# Patient Record
Sex: FEMALE | Race: WHITE | NOT HISPANIC OR LATINO | Employment: OTHER | URBAN - METROPOLITAN AREA
[De-identification: names, ages, dates, MRNs, and addresses within clinical notes are randomized per-mention and may not be internally consistent; named-entity substitution may affect disease eponyms.]

---

## 2021-04-16 ENCOUNTER — APPOINTMENT (OUTPATIENT)
Dept: GENERAL RADIOLOGY | Facility: CLINIC | Age: 66
End: 2021-04-16
Attending: EMERGENCY MEDICINE
Payer: COMMERCIAL

## 2021-04-16 ENCOUNTER — HOSPITAL ENCOUNTER (EMERGENCY)
Facility: CLINIC | Age: 66
Discharge: HOME | End: 2021-04-16
Attending: EMERGENCY MEDICINE | Admitting: EMERGENCY MEDICINE
Payer: COMMERCIAL

## 2021-04-16 ENCOUNTER — APPOINTMENT (OUTPATIENT)
Dept: CT IMAGING | Facility: CLINIC | Age: 66
End: 2021-04-16
Attending: EMERGENCY MEDICINE
Payer: COMMERCIAL

## 2021-04-16 VITALS
RESPIRATION RATE: 18 BRPM | HEIGHT: 63 IN | TEMPERATURE: 98.1 F | OXYGEN SATURATION: 99 % | WEIGHT: 143 LBS | BODY MASS INDEX: 25.34 KG/M2 | DIASTOLIC BLOOD PRESSURE: 78 MMHG | SYSTOLIC BLOOD PRESSURE: 137 MMHG | HEART RATE: 121 BPM

## 2021-04-16 DIAGNOSIS — R05.9 COUGH: ICD-10-CM

## 2021-04-16 LAB
ANION GAP SERPL CALCULATED.3IONS-SCNC: 6 MMOL/L (ref 3–14)
BASOPHILS # BLD AUTO: 0.1 10E9/L (ref 0–0.2)
BASOPHILS NFR BLD AUTO: 0.8 %
BUN SERPL-MCNC: 12 MG/DL (ref 7–30)
CALCIUM SERPL-MCNC: 8.2 MG/DL (ref 8.5–10.1)
CHLORIDE SERPL-SCNC: 112 MMOL/L (ref 94–109)
CO2 SERPL-SCNC: 23 MMOL/L (ref 20–32)
CREAT SERPL-MCNC: 0.72 MG/DL (ref 0.52–1.04)
DIFFERENTIAL METHOD BLD: NORMAL
EOSINOPHIL # BLD AUTO: 0.2 10E9/L (ref 0–0.7)
EOSINOPHIL NFR BLD AUTO: 2.4 %
ERYTHROCYTE [DISTWIDTH] IN BLOOD BY AUTOMATED COUNT: 12.8 % (ref 10–15)
FLUAV RNA RESP QL NAA+PROBE: NEGATIVE
FLUBV RNA RESP QL NAA+PROBE: NEGATIVE
GFR SERPL CREATININE-BSD FRML MDRD: 89 ML/MIN/{1.73_M2}
GLUCOSE SERPL-MCNC: 92 MG/DL (ref 70–99)
HCT VFR BLD AUTO: 41.6 % (ref 35–47)
HGB BLD-MCNC: 14 G/DL (ref 11.7–15.7)
IMM GRANULOCYTES # BLD: 0 10E9/L (ref 0–0.4)
IMM GRANULOCYTES NFR BLD: 0.2 %
LABORATORY COMMENT REPORT: NORMAL
LYMPHOCYTES # BLD AUTO: 3.6 10E9/L (ref 0.8–5.3)
LYMPHOCYTES NFR BLD AUTO: 39.3 %
MCH RBC QN AUTO: 28.8 PG (ref 26.5–33)
MCHC RBC AUTO-ENTMCNC: 33.7 G/DL (ref 31.5–36.5)
MCV RBC AUTO: 86 FL (ref 78–100)
MONOCYTES # BLD AUTO: 0.8 10E9/L (ref 0–1.3)
MONOCYTES NFR BLD AUTO: 8.2 %
NEUTROPHILS # BLD AUTO: 4.5 10E9/L (ref 1.6–8.3)
NEUTROPHILS NFR BLD AUTO: 49.1 %
NRBC # BLD AUTO: 0 10*3/UL
NRBC BLD AUTO-RTO: 0 /100
PLATELET # BLD AUTO: 321 10E9/L (ref 150–450)
POTASSIUM SERPL-SCNC: 3 MMOL/L (ref 3.4–5.3)
RBC # BLD AUTO: 4.86 10E12/L (ref 3.8–5.2)
RSV RNA SPEC QL NAA+PROBE: NORMAL
SARS-COV-2 RNA RESP QL NAA+PROBE: NEGATIVE
SODIUM SERPL-SCNC: 141 MMOL/L (ref 133–144)
SPECIMEN SOURCE: NORMAL
WBC # BLD AUTO: 9.2 10E9/L (ref 4–11)

## 2021-04-16 PROCEDURE — 71046 X-RAY EXAM CHEST 2 VIEWS: CPT

## 2021-04-16 PROCEDURE — 85025 COMPLETE CBC W/AUTO DIFF WBC: CPT | Performed by: EMERGENCY MEDICINE

## 2021-04-16 PROCEDURE — 250N000011 HC RX IP 250 OP 636: Performed by: EMERGENCY MEDICINE

## 2021-04-16 PROCEDURE — C9803 HOPD COVID-19 SPEC COLLECT: HCPCS

## 2021-04-16 PROCEDURE — 80048 BASIC METABOLIC PNL TOTAL CA: CPT | Performed by: EMERGENCY MEDICINE

## 2021-04-16 PROCEDURE — 87636 SARSCOV2 & INF A&B AMP PRB: CPT | Performed by: EMERGENCY MEDICINE

## 2021-04-16 PROCEDURE — 71275 CT ANGIOGRAPHY CHEST: CPT

## 2021-04-16 PROCEDURE — 250N000009 HC RX 250: Performed by: EMERGENCY MEDICINE

## 2021-04-16 PROCEDURE — 99285 EMERGENCY DEPT VISIT HI MDM: CPT | Mod: 25

## 2021-04-16 RX ORDER — CLONAZEPAM 0.5 MG/1
0.5 TABLET ORAL 2 TIMES DAILY PRN
Status: ON HOLD | COMMUNITY
End: 2023-08-04

## 2021-04-16 RX ORDER — IOPAMIDOL 755 MG/ML
58 INJECTION, SOLUTION INTRAVASCULAR ONCE
Status: COMPLETED | OUTPATIENT
Start: 2021-04-16 | End: 2021-04-16

## 2021-04-16 RX ADMIN — SODIUM CHLORIDE 85 ML: 9 INJECTION, SOLUTION INTRAVENOUS at 23:10

## 2021-04-16 RX ADMIN — IOPAMIDOL 58 ML: 755 INJECTION, SOLUTION INTRAVENOUS at 23:10

## 2021-04-16 ASSESSMENT — MIFFLIN-ST. JEOR: SCORE: 1172.77

## 2021-04-16 ASSESSMENT — ENCOUNTER SYMPTOMS
NERVOUS/ANXIOUS: 1
COUGH: 1

## 2021-04-17 LAB — INTERPRETATION ECG - MUSE: NORMAL

## 2021-04-17 NOTE — DISCHARGE INSTRUCTIONS
As we discussed, your CT scan is normal today, no evidence of a blood clot, no evidence of pneumonia, and what you have is therefore likely bronchitis or a mild cause of cough.  Please follow with your regular doctor in the week ahead, this is very important since no clear cause of your cough was found today.  Come back to the ER with any other concerns.

## 2021-04-17 NOTE — ED TRIAGE NOTES
Patient presents with complaints of a dry harsh cough for the past three days. She states that she has had multiple negative Covid19 tests, the latest was yesterday that showed a negative result this morning. Patient states that she feels the need to take a deep breath after a coughing spell, otherwise denied any shortness of breath and does not appear to be in any distress here on arrival. Patient's daughter tested positive for Covid19 about two weeks ago and the patient had contact with her at that time.

## 2021-04-17 NOTE — ED PROVIDER NOTES
"  History   Chief Complaint:  Cough       The history is provided by the patient.      Mag Khanna is a 65 year old female with history of anxiety who presents for evaluation of an intermittent dry cough starting around four days ago. The patient states that she has had no other symptoms and no hemoptysis. She notes she has never had this before. She also notes moderate anxiety because of this and the events of the past six months. She denies any history of blood clots and has not been diagnosed with COVID, noting two negative tests. She notes that her daughter whom she had contact with tested positive two weeks ago.       Review of Systems   Respiratory: Positive for cough.    Psychiatric/Behavioral: The patient is nervous/anxious.    All other systems reviewed and are negative.        Allergies:  No Known Drug Allergies     Medications:  Klonopin    Past Medical History:    Anxiety     Past Surgical History:    Cataracts  Hysterectomy  Breast augmentation  chemodenervation    Family History:    COPD    Social History:  The patient presents to the emergency department alone.      Physical Exam     Patient Vitals for the past 24 hrs:   BP Temp Temp src Pulse Resp SpO2 Height Weight   04/16/21 2248 -- -- -- -- -- 99 % -- --   04/16/21 2150 -- -- -- -- -- 100 % -- --   04/16/21 2045 137/78 98.1  F (36.7  C) Oral 121 18 98 % 1.6 m (5' 3\") 64.9 kg (143 lb)       Physical Exam  Vitals: reviewed by me  General: Pt seen on [facility], pleasant, cooperative, and alert to conversation, mildly anxious appearing.  Eyes: Tracking well, clear conjunctiva BL  ENT: MMM, midline trachea.   Lungs:   No tachypnea, no accessory muscle use. No respiratory distress.  Coughing occasionally.  CV: Rate as above, regular rhythm.    Abd: Soft, non tender, no guarding, no rebound. Non distended  MSK: no peripheral edema or joint effusion.  No evidence of trauma  Skin: No rash, normal turgor and temperature  Neuro: Clear speech and no " facial droop.  Psych: Not RIS, no e/o AH/VH      Emergency Department Course     ECG:  ECG taken at , ECG read at   Sinus tachycardia.  Possible left atrial enlargement.  Borderline ECG.  Rate 125 bpm. NH interval 146 ms. QRS duration 72 ms. QT/QTc 314/453 ms. P-R-T axis 67 81 68.       Imaging:    XR Chest 2 views:   Negative chest.  As per radiology.    CT Chest (PE) with Contrast:  1.  No pulmonary embolism. No acute abnormalities evident. No etiology for symptoms identified.  As per radiology.     Laboratory:  CBC: WBC: 9.2, HB.0, PLT: 321    BMP: Potassium 3.0 (low), Chloride 112 (high), Calcium 8.2 (low), o/w WNL (Creatinine: 0.72)    Symptomatic Influenza A/B & SARS-CoV2 (COVID-19) Virus PCR Multiplex: Negative    Emergency Department Course:    Reviewed:  : I reviewed the patient's nursing notes, vitals and past medical records.    Assessments:  : I assessed the patient and performed a physical exam at this time.  : I reassessed the patient and informed them of their workup thus far.  At this point I feel that the patient is safe for discharge, and the patient agrees    Disposition:  The patient was discharged to home.     Impression & Plan     Covid-19  Mag Khanna was evaluated during a global COVID-19 pandemic, which necessitated consideration that the patient might be at risk for infection with the SARS-CoV-2 virus that causes COVID-19.   Applicable protocols for evaluation were followed during the patient's care.   COVID-19 was considered as part of the patient's evaluation. The plan for testing is:  a test was obtained during this visit.     Medical Decision Making:  Mag Khanna is a very pleasant 65 year old female who presents to the emergency department with cough for the last several days. Her x-ray is unremarkable, has normal labs and is not coughing up any blood. However, given her age and veracity of the cough and what she describes as an elevated heart rate, I did opt  for a CT scan which she states she would be in favor of as she feels that a full workup would give her a degree of reassurance, which is of course very reasonable. CT scan was done and shows no pulmonary embolism, no organized pneumonia, and I think taht she may simply have bronchitis. She feels relieved with the negative workup today and we discussed return precautions and instructions for primary care follow-up. The patient is okay with this plan.     Diagnosis:    ICD-10-CM    1. Cough  R05        Discharge Medications:  New Prescriptions    No medications on file       Scribe Disclosure:  I, Chuckie Hardingme, am serving as a scribe at 10:37 PM on 4/16/2021 to document services personally performed by Brayan Arora MD based on my observations and the provider's statements to me.          Brayan Arora MD  04/16/21 [id number]

## 2021-04-30 ENCOUNTER — HOSPITAL ENCOUNTER (EMERGENCY)
Facility: CLINIC | Age: 66
Discharge: HOME | End: 2021-04-30
Attending: EMERGENCY MEDICINE | Admitting: EMERGENCY MEDICINE
Payer: COMMERCIAL

## 2021-04-30 VITALS
BODY MASS INDEX: 25.52 KG/M2 | HEIGHT: 63 IN | OXYGEN SATURATION: 98 % | HEART RATE: 112 BPM | DIASTOLIC BLOOD PRESSURE: 89 MMHG | WEIGHT: 144 LBS | TEMPERATURE: 97.7 F | RESPIRATION RATE: 16 BRPM | SYSTOLIC BLOOD PRESSURE: 146 MMHG

## 2021-04-30 DIAGNOSIS — J20.9 ACUTE BRONCHITIS, UNSPECIFIED ORGANISM: ICD-10-CM

## 2021-04-30 PROCEDURE — 99283 EMERGENCY DEPT VISIT LOW MDM: CPT

## 2021-04-30 RX ORDER — BENZONATATE 200 MG/1
200 CAPSULE ORAL 3 TIMES DAILY PRN
Qty: 20 CAPSULE | Refills: 0 | Status: ON HOLD | OUTPATIENT
Start: 2021-04-30 | End: 2023-08-04

## 2021-04-30 RX ORDER — FEXOFENADINE HCL AND PSEUDOEPHEDRINE HCI 60; 120 MG/1; MG/1
1 TABLET, EXTENDED RELEASE ORAL 2 TIMES DAILY
Qty: 30 TABLET | Refills: 0 | Status: ON HOLD | OUTPATIENT
Start: 2021-04-30 | End: 2023-08-04

## 2021-04-30 RX ORDER — DOXYCYCLINE 100 MG/1
100 CAPSULE ORAL 2 TIMES DAILY
Qty: 20 CAPSULE | Refills: 0 | Status: SHIPPED | OUTPATIENT
Start: 2021-04-30 | End: 2021-05-10

## 2021-04-30 ASSESSMENT — ENCOUNTER SYMPTOMS
SHORTNESS OF BREATH: 1
FEVER: 0
COUGH: 1

## 2021-04-30 ASSESSMENT — MIFFLIN-ST. JEOR: SCORE: 1177.31

## 2021-04-30 NOTE — ED PROVIDER NOTES
"  History   Chief Complaint:  Cough     HPI   Mag Khanna is a 65 year old female with a history of anxiety who presents for evaluation of cough. Over the past few weeks, the patient states that she's had an intermittent dry cough that makes her short of breath. She was seen 21 in the ED for the same symptoms where a work up was done, see below. She followed up her PCP who thought her symptoms may be from post-nasal drip or anxiety. She denies fever, congestion, phlegm, or discolored sputum. She denies allergies to antibiotics. She received her first dose of the Pfizer COVID-19 vaccine and is scheduled to have her second dose today.    Workup 2021  CBC: WBC: 9.2, HB.0, PLT: 321  BMP: Potassium 3.0 (low), Chloride 112 (high), Calcium 8.2 (low), o/w WNL (Creatinine: 0.72)  Symptomatic Influenza A/B & SARS-CoV2 (COVID-19) Virus PCR Multiplex: Negative    XR Chest 2 views:   Negative chest.    CT Chest (PE) with Contrast:  1.  No pulmonary embolism. No acute abnormalities evident. No etiology for symptoms identified.    Review of Systems   Constitutional: Negative for fever.   HENT: Negative for congestion and postnasal drip.    Respiratory: Positive for cough and shortness of breath.    All other systems reviewed and are negative.        Allergies:  The patient has no known allergies.     Medications:  Klonopin  Ativan  Zoloft    Past Medical History:    Anxiety   Cataracts    Past Surgical History:    Hysterectomy   Breast augmentation   Chemodenervation   Cataract extraction with IOL     Family History:    COPD - father, mother     Social History:  Presents to the ED: with her    Tobacco use: Non-smoker  Alcohol use: Yes   Drug use: No   PCP: [facility]    Physical Exam     Patient Vitals for the past 24 hrs:   BP Temp Temp src Pulse Resp SpO2 Height Weight   21 1238 (!) 146/89 97.7  F (36.5  C) Oral 112 16 98 % 1.6 m (5' 3\") 65.3 kg (144 lb)       Physical Exam  HENT:      " Head: Normocephalic.      Right Ear: Tympanic membrane normal.      Left Ear: Tympanic membrane normal.   Cardiovascular:      Rate and Rhythm: Normal rate and regular rhythm.   Pulmonary:      Effort: Pulmonary effort is normal.      Breath sounds: Normal breath sounds. No stridor. No wheezing or rhonchi.   Abdominal:      General: Abdomen is flat.   Musculoskeletal: Normal range of motion.   Skin:     General: Skin is warm.      Capillary Refill: Capillary refill takes less than 2 seconds.   Neurological:      Mental Status: She is alert.         Emergency Department Course     Emergency Department Course:    Reviewed:  I reviewed the patient's nursing notes, vitals and past medical history.     Assessments:  1236 I performed an exam of the patient in room ED26 as documented above.    Disposition:  The patient was discharged to home.     Impression & Plan     CMS Diagnoses: None    Medical Decision Making:  Mag Khanna is a 65 year old female who presents to the emergency department today for evaluation of 2 weeks of coughing.  Patient is already been ruled out for Covid.  Patient seems to be very focused on her cough but barely coughs while I am talking to her.  She has been seen by her primary doctor for this as well clinical suspicions with postnasal drip.  Patient denies productive cough or high fever.  Did consider repeat chest imaging but based on 2 weeks of symptomatology offered an empiric antibiotic for bronchitis Tessalon Perles and decongestant in the form of Allegra-D and follow-up with primary care.  No need for emergency imaging as patient is already had chest and x-ray and CT scan at prior visit patient is offered reassurance and discharged home      Diagnosis:    ICD-10-CM    1. Acute bronchitis, unspecified organism  J20.9        Discharge Medications:   Discharge Medication List as of 4/30/2021  1:01 PM      START taking these medications    Details   benzonatate (TESSALON) 200 MG capsule Take 1  capsule (200 mg) by mouth 3 times daily as needed for cough, Disp-20 capsule, R-0, Local Print      doxycycline hyclate (VIBRAMYCIN) 100 MG capsule Take 1 capsule (100 mg) by mouth 2 times daily for 10 days, Disp-20 capsule, R-0, Local Print      fexofenadine-pseudoePHEDrine (ALLEGRA-D)  MG 12 hr tablet Take 1 tablet by mouth 2 times daily, Disp-30 tablet, R-0, Local Print           Scribe Disclosure:  I, Shayla Pereira, am serving as a scribe at 12:36 PM on 4/30/2021 to document services personally performed by Ryan Fernandes MD based on my observations and the provider's statements to me.    This note was completed in part using Dragon voice recognition software. Although reviewed after completion, some word and grammatical errors may occur.      Ryan Fernandes MD  05/02/21 7529

## 2021-04-30 NOTE — DISCHARGE INSTRUCTIONS
Since you have been coughing for 2 weeks without resolution we are initiating an antibiotic.  Your oxygen levels are normal today.  Use cough medication as needed.  Please reschedule your Covid vaccination and return with increased shortness of breath or high fever.  If your cough continues please follow-up with your regular doctor for reassessment.

## 2021-04-30 NOTE — ED TRIAGE NOTES
Seen two weeks ago for same thing. Dry cough with makes her SOB. Pt has had 2 negative covid tests.

## 2021-05-07 ENCOUNTER — HOSPITAL ENCOUNTER (EMERGENCY)
Facility: CLINIC | Age: 66
Discharge: HOME | End: 2021-05-07
Attending: EMERGENCY MEDICINE | Admitting: EMERGENCY MEDICINE
Payer: COMMERCIAL

## 2021-05-07 VITALS
BODY MASS INDEX: 25.34 KG/M2 | WEIGHT: 143 LBS | HEIGHT: 63 IN | SYSTOLIC BLOOD PRESSURE: 137 MMHG | HEART RATE: 85 BPM | TEMPERATURE: 97.5 F | OXYGEN SATURATION: 100 % | RESPIRATION RATE: 16 BRPM | DIASTOLIC BLOOD PRESSURE: 72 MMHG

## 2021-05-07 DIAGNOSIS — R05.9 COUGH: ICD-10-CM

## 2021-05-07 DIAGNOSIS — K21.9 GASTROESOPHAGEAL REFLUX DISEASE, UNSPECIFIED WHETHER ESOPHAGITIS PRESENT: ICD-10-CM

## 2021-05-07 PROCEDURE — 99282 EMERGENCY DEPT VISIT SF MDM: CPT

## 2021-05-07 ASSESSMENT — ENCOUNTER SYMPTOMS
SHORTNESS OF BREATH: 1
COUGH: 1

## 2021-05-07 ASSESSMENT — MIFFLIN-ST. JEOR: SCORE: 1172.77

## 2021-05-07 NOTE — ED TRIAGE NOTES
Pt has been seen multiple times for a cough, not improving, has had first covid vaccine, mild sob

## 2021-05-08 ASSESSMENT — ENCOUNTER SYMPTOMS
ABDOMINAL PAIN: 0
TROUBLE SWALLOWING: 0
FEVER: 0

## 2021-05-08 NOTE — ED PROVIDER NOTES
"  History   Chief Complaint:  Cough       HPI   Mag Khanna is a 65 year old female who presents with cough. The patient stated that her symptoms started about a month ago, when she developed cough that comes and goes away, along with some shortness of breath which was causing her to feel more anxious. Since then she was seen for the same symptoms twice in [facility] ED, on 16th and 30th of April. The patient asked her primary for something stronger than lorazepam for her anxiety and was prescribed Clonazepam. She said she is feeling anxious constantly and that she cannot relax. She denies taking blood pressure medications, fever, trouble swallowing or any chronic medical conditions or smoking.      Review of Systems   Constitutional: Negative for fever.   HENT: Negative for trouble swallowing.    Respiratory: Positive for cough and shortness of breath.    Cardiovascular: Negative for chest pain.   Gastrointestinal: Negative for abdominal pain.   All other systems reviewed and are negative.    Allergies:  The patient has no known allergies.     Medications:  Benzonatate  Clonazepam  Vibramycin  Allegra-D    Past Medical History:    Anxiety  Cataracts     Past Surgical History:    Hysterectomy  Breast augmentation  CHemodenervation  Cataract extraction     Family History:    The patient denies past family history.     Social History:  The patient was brought to the emergency department by private vehicle.  The patient presents to the emergency department with her .    Physical Exam     Patient Vitals for the past 24 hrs:   BP Temp Temp src Pulse Resp SpO2 Height Weight   05/07/21 1831 137/72 97.5  F (36.4  C) Temporal 85 16 100 % 1.6 m (5' 3\") 64.9 kg (143 lb)       Physical Exam   General: Alert and cooperative with exam. Patient in mild distress. Normal mentation. Anxious appearance.   Head:  Scalp is NC/AT  Eyes:  No scleral icterus, PERRL  ENT:  The external nose and ears are normal. mucus membranes are " moist. Uvula midline, no evidence of deep space infection. Mild posterior oropharynx erythema.  Neck:  Normal range of motion without rigidity.  CV:  Regular rate and rhythm    No pathologic murmur   Resp:  Breath sounds are clear bilaterally    Non-labored, no retractions or accessory muscle use  GI:  Abdomen is soft, no distension, no tenderness. No peritoneal signs  MS:  No lower extremity edema   Skin:  Warm and dry, No rash or lesions noted.  Neuro: Oriented x 3. No gross motor deficits.      Emergency Department Course     Emergency Department Course:    Reviewed:  I reviewed nursing notes, vitals, past medical history and care everywhere    Assessments/Consults  ED Course as of May 07 1925   Fri May 07, 2021   1924 I obtained history and examined the patient as noted above.        Disposition:  The patient was discharged to home.       Impression & Plan     Medical Decision Making:  The patient is a 65-year-old female presents with intermittent cough since mid April.  Patient's medical history and records reviewed.  She has undergone recent extensive negative work-up including negative CTA of the chest as well as negative lab and cardiac work-up.  Here in the ED she is afebrile and has no history of fever and she does not feel her symptoms are infectious.  She has completed a course of doxycycline without any significant change.  Possibilities for symptoms include acid reflux, viral bronchitis, postnasal drip, anxiety, among others.  Recommended trial of Pepcid and discussed supportive care for GERD.  Patient should follow-up closely with her PCP for reevaluation; may benefit from outpatient ENT evaluation if still no improvement.  Return precautions were discussed.  Discharged home.  No emergent indication for further labs or imaging at this time.    Diagnosis:    ICD-10-CM    1. Cough  R05    2. Gastroesophageal reflux disease, unspecified whether esophagitis present  K21.9        Scribe Disclosure:  I  Alfonso Guardado, am serving as a scribe at 7:25 PM on 5/7/2021 to document services personally performed by Larry Gates DO based on my observations and the provider's statements to me.            Larry Gates DO  05/08/21 0119

## 2022-03-24 ENCOUNTER — APPOINTMENT (OUTPATIENT)
Dept: GENERAL RADIOLOGY | Facility: CLINIC | Age: 67
End: 2022-03-24
Attending: EMERGENCY MEDICINE
Payer: COMMERCIAL

## 2022-03-24 ENCOUNTER — HOSPITAL ENCOUNTER (EMERGENCY)
Facility: CLINIC | Age: 67
Discharge: HOME | End: 2022-03-24
Attending: EMERGENCY MEDICINE | Admitting: EMERGENCY MEDICINE
Payer: COMMERCIAL

## 2022-03-24 VITALS
TEMPERATURE: 97.3 F | DIASTOLIC BLOOD PRESSURE: 75 MMHG | OXYGEN SATURATION: 98 % | HEART RATE: 110 BPM | BODY MASS INDEX: 22.85 KG/M2 | SYSTOLIC BLOOD PRESSURE: 125 MMHG | RESPIRATION RATE: 17 BRPM | WEIGHT: 129 LBS

## 2022-03-24 DIAGNOSIS — R06.00 DYSPNEA, UNSPECIFIED TYPE: ICD-10-CM

## 2022-03-24 LAB
ANION GAP SERPL CALCULATED.3IONS-SCNC: 7 MMOL/L (ref 3–14)
ATRIAL RATE - MUSE: 89 BPM
BASE EXCESS BLDV CALC-SCNC: -2.3 MMOL/L (ref -7.7–1.9)
BASOPHILS # BLD AUTO: 0.1 10E3/UL (ref 0–0.2)
BASOPHILS NFR BLD AUTO: 1 %
BUN SERPL-MCNC: 12 MG/DL (ref 7–30)
CALCIUM SERPL-MCNC: 8.6 MG/DL (ref 8.5–10.1)
CHLORIDE BLD-SCNC: 113 MMOL/L (ref 94–109)
CO2 SERPL-SCNC: 21 MMOL/L (ref 20–32)
CREAT SERPL-MCNC: 0.66 MG/DL (ref 0.52–1.04)
D DIMER PPP FEU-MCNC: 0.58 UG/ML FEU (ref 0–0.5)
DIASTOLIC BLOOD PRESSURE - MUSE: NORMAL MMHG
EOSINOPHIL # BLD AUTO: 0.2 10E3/UL (ref 0–0.7)
EOSINOPHIL NFR BLD AUTO: 3 %
ERYTHROCYTE [DISTWIDTH] IN BLOOD BY AUTOMATED COUNT: 12.8 % (ref 10–15)
FLUAV RNA SPEC QL NAA+PROBE: NEGATIVE
FLUBV RNA RESP QL NAA+PROBE: NEGATIVE
GFR SERPL CREATININE-BSD FRML MDRD: >90 ML/MIN/1.73M2
GLUCOSE BLD-MCNC: 103 MG/DL (ref 70–99)
HCO3 BLDV-SCNC: 22 MMOL/L (ref 21–28)
HCT VFR BLD AUTO: 43.5 % (ref 35–47)
HGB BLD-MCNC: 14.2 G/DL (ref 11.7–15.7)
IMM GRANULOCYTES # BLD: 0 10E3/UL
IMM GRANULOCYTES NFR BLD: 0 %
INTERPRETATION ECG - MUSE: NORMAL
LYMPHOCYTES # BLD AUTO: 3 10E3/UL (ref 0.8–5.3)
LYMPHOCYTES NFR BLD AUTO: 35 %
MCH RBC QN AUTO: 29 PG (ref 26.5–33)
MCHC RBC AUTO-ENTMCNC: 32.6 G/DL (ref 31.5–36.5)
MCV RBC AUTO: 89 FL (ref 78–100)
MONOCYTES # BLD AUTO: 0.7 10E3/UL (ref 0–1.3)
MONOCYTES NFR BLD AUTO: 8 %
NEUTROPHILS # BLD AUTO: 4.5 10E3/UL (ref 1.6–8.3)
NEUTROPHILS NFR BLD AUTO: 53 %
NRBC # BLD AUTO: 0 10E3/UL
NRBC BLD AUTO-RTO: 0 /100
NT-PROBNP SERPL-MCNC: 41 PG/ML (ref 0–900)
O2/TOTAL GAS SETTING VFR VENT: 21 %
P AXIS - MUSE: 61 DEGREES
PCO2 BLDV: 33 MM HG (ref 40–50)
PH BLDV: 7.42 [PH] (ref 7.32–7.43)
PLATELET # BLD AUTO: 303 10E3/UL (ref 150–450)
PO2 BLDV: 29 MM HG (ref 25–47)
POTASSIUM BLD-SCNC: 3.9 MMOL/L (ref 3.4–5.3)
PR INTERVAL - MUSE: 152 MS
QRS DURATION - MUSE: 70 MS
QT - MUSE: 380 MS
QTC - MUSE: 462 MS
R AXIS - MUSE: 71 DEGREES
RBC # BLD AUTO: 4.9 10E6/UL (ref 3.8–5.2)
RSV RNA SPEC NAA+PROBE: NEGATIVE
SARS-COV-2 RNA RESP QL NAA+PROBE: NEGATIVE
SODIUM SERPL-SCNC: 141 MMOL/L (ref 133–144)
SYSTOLIC BLOOD PRESSURE - MUSE: NORMAL MMHG
T AXIS - MUSE: 67 DEGREES
TROPONIN I SERPL HS-MCNC: 3 NG/L
VENTRICULAR RATE- MUSE: 89 BPM
WBC # BLD AUTO: 8.5 10E3/UL (ref 4–11)

## 2022-03-24 PROCEDURE — C9803 HOPD COVID-19 SPEC COLLECT: HCPCS

## 2022-03-24 PROCEDURE — 36415 COLL VENOUS BLD VENIPUNCTURE: CPT | Performed by: EMERGENCY MEDICINE

## 2022-03-24 PROCEDURE — 85379 FIBRIN DEGRADATION QUANT: CPT | Performed by: EMERGENCY MEDICINE

## 2022-03-24 PROCEDURE — 80048 BASIC METABOLIC PNL TOTAL CA: CPT | Performed by: EMERGENCY MEDICINE

## 2022-03-24 PROCEDURE — 87637 SARSCOV2&INF A&B&RSV AMP PRB: CPT | Performed by: EMERGENCY MEDICINE

## 2022-03-24 PROCEDURE — 99285 EMERGENCY DEPT VISIT HI MDM: CPT | Mod: 25

## 2022-03-24 PROCEDURE — 85025 COMPLETE CBC W/AUTO DIFF WBC: CPT | Performed by: EMERGENCY MEDICINE

## 2022-03-24 PROCEDURE — 84484 ASSAY OF TROPONIN QUANT: CPT | Performed by: EMERGENCY MEDICINE

## 2022-03-24 PROCEDURE — 93005 ELECTROCARDIOGRAM TRACING: CPT

## 2022-03-24 PROCEDURE — 71046 X-RAY EXAM CHEST 2 VIEWS: CPT

## 2022-03-24 PROCEDURE — 83880 ASSAY OF NATRIURETIC PEPTIDE: CPT | Performed by: EMERGENCY MEDICINE

## 2022-03-24 PROCEDURE — 82803 BLOOD GASES ANY COMBINATION: CPT | Performed by: EMERGENCY MEDICINE

## 2022-03-24 ASSESSMENT — ENCOUNTER SYMPTOMS
VOMITING: 0
CONSTIPATION: 0
COUGH: 1
PALPITATIONS: 1
CHILLS: 0
DIARRHEA: 0
FEVER: 0
NAUSEA: 0
SHORTNESS OF BREATH: 1

## 2022-03-25 NOTE — ED PROVIDER NOTES
History   Chief Complaint:  Shortness of Breath       The history is provided by the patient.      Mag Khanna is a 66 year old female with history of anxiety who presents for evaluation of shortness of breath. She reports shortness of breath that comes and goes, noting a visit to the emergency department for similar symptoms about 6 months ago. The patient reports having to draw in deep breaths, but also notes an occasional dry cough with deep inhalation. She does not think her shortness of breath is worse with exertion.  The patient reports that today may not have been much worse, but her  thinks it has been worse than in the past. He notes that today was extra stressful because they went to a . The patient reports that her symptoms may be anxiety, but she wants to make sure that they are not due to other issues. She mentions palpitations occurring every once in a while, noting only a few beats at a time. The patient has lorazepam and states that she often takes this when she feels short of breath. She states that she feels like this helps, but not always fully. The patient speaks with a therapist to have her prescriptions filled, but does not see this person regularly.  She has not had a stress test or echo in the past. The patient denies chest pain, fevers, chills, nausea, vomiting, and changes in urination or bowel movements. She also denies personal or family history of PE/DVT. She notes that her dad had a stroke. The patient denies tobacco use since she was 31 years old, reports occasional alcohol consumption. She is vaccinated for COVID-19.     Review of Systems   Constitutional: Negative for chills and fever.   Respiratory: Positive for cough and shortness of breath.    Cardiovascular: Positive for palpitations. Negative for chest pain.   Gastrointestinal: Negative for constipation, diarrhea, nausea and vomiting.   Genitourinary: Negative.    All other systems reviewed and are  negative.        Allergies:  The patient has no known allergies.     Medications:  Tessalon  Clonazepam  Allegra  Lorazepam     Past Medical History:     Cataracts, bilateral  Anxiety      Past Surgical History:    Hysterectomy  Breast augmentation  Chemodenervation  Cataract extraction w/ intraocular lens implant x2     Family History:    COPD    Social History:  The patient presents with her .  Denies tobacco use, reports occasional alcohol consumption.      Physical Exam     Patient Vitals for the past 24 hrs:   BP Temp Temp src Pulse Resp SpO2 Weight   03/24/22 2300 125/75 -- -- 110 -- 98 % --   03/24/22 2230 124/79 -- -- 105 -- 99 % --   03/24/22 2200 109/74 -- -- 91 17 99 % --   03/24/22 2130 122/79 -- -- 89 19 99 % --   03/24/22 2100 118/78 -- -- 89 18 99 % --   03/24/22 2030 110/73 -- -- 99 14 100 % --   03/24/22 2020 112/82 -- -- 103 (!) 36 100 % --   03/24/22 2004 117/85 97.3  F (36.3  C) Temporal 106 15 97 % 58.5 kg (129 lb)       Physical Exam  Constitutional: Well developed, nontox appearance  Head: Atraumatic.   Neck:  no stridor  Eyes: no scleral icterus  Cardiovascular: Borderline regular tachycardia, 2+ bilat radial pulses  Pulmonary/Chest: nml resp effort, Clear BS bilat  Abdominal: ND, soft, NT, no rebound or guarding   Ext: Warm, well perfused, no edema  Neurological: A&O, symmetric facies, moves ext x4  Skin: Skin is warm and dry.   Psychiatric: Somewhat anxious  Nursing note and vitals reviewed.    Emergency Department Course   ECG  ECG obtained at 2103, ECG read at 2107.  Normal sinus rhythm. Cannot rule out anterior infarct, age undetermined. Abnormal ECG.    No significant change as compared to prior, dated 4/16/2021.  Rate 89 bpm. VT interval 152 ms. QRS duration 70 ms. QT/QTc 380/462 ms. P-R-T axes 61 71 67.      Imaging:  XR Chest 2 Views   Final Result   IMPRESSION: Negative chest.      Report per radiology    Laboratory:  Labs Ordered and Resulted from Time of ED Arrival to Time  of ED Departure   D DIMER QUANTITATIVE - Abnormal       Result Value    D-Dimer Quantitative 0.58 (*)    BASIC METABOLIC PANEL - Abnormal    Sodium 141      Potassium 3.9      Chloride 113 (*)     Carbon Dioxide (CO2) 21      Anion Gap 7      Urea Nitrogen 12      Creatinine 0.66      Calcium 8.6      Glucose 103 (*)     GFR Estimate >90     BLOOD GAS VENOUS - Abnormal    pH Venous 7.42      pCO2 Venous 33 (*)     pO2 Venous 29      Bicarbonate Venous 22      Base Excess/Deficit (+/-) -2.3      FIO2 21     TROPONIN I - Normal    Troponin I High Sensitivity 3     NT PROBNP INPATIENT - Normal    N terminal Pro BNP Inpatient 41     INFLUENZA A/B & SARS-COV2 PCR MULTIPLEX - Normal    Influenza A PCR Negative      Influenza B PCR Negative      RSV PCR Negative      SARS CoV2 PCR Negative     CBC WITH PLATELETS AND DIFFERENTIAL    WBC Count 8.5      RBC Count 4.90      Hemoglobin 14.2      Hematocrit 43.5      MCV 89      MCH 29.0      MCHC 32.6      RDW 12.8      Platelet Count 303      % Neutrophils 53      % Lymphocytes 35      % Monocytes 8      % Eosinophils 3      % Basophils 1      % Immature Granulocytes 0      NRBCs per 100 WBC 0      Absolute Neutrophils 4.5      Absolute Lymphocytes 3.0      Absolute Monocytes 0.7      Absolute Eosinophils 0.2      Absolute Basophils 0.1      Absolute Immature Granulocytes 0.0      Absolute NRBCs 0.0          Emergency Department Course:     Reviewed:  I reviewed nursing notes, vitals and Care Everywhere    Assessments:   I obtained history and examined the patient as noted above.    I rechecked the patient and explained findings. At this point I feel that the patient is safe for discharge, and the patient agrees.     Disposition:  The patient was discharged to home.     Impression & Plan     Medical Decision Makin year old female presenting w/ shortness of breath, nonexertional     DDx includes anxiety, generalized anxiety disorder, panic attack, ACS, CHF, PE,  viral illness, pneumonia.  EKG interpretation as noted above.  Labs significant for D-dimer within age-adjusted normal limits, otherwise unremarkable.  Imaging sig for no acute cardiopulmonary disease.  Patient symptoms seem somewhat anxiety related particularly given her history, it may be prudent to have her follow-up with her PCP for further discussion of outpatient stress test.  She is understanding and agreeable to this plan.  Given her reassuring work-up and normal vital signs, at this time I feel the pt is safe for discharge.  Recommendations given regarding follow up with PCP and return to the emergency department as needed for new or worsening symptoms.  Patient and  counseled on all results, disposition and diagnosis.  They are understanding and agreeable to plan. Patient discharged in stable condition.         Diagnosis:    ICD-10-CM    1. Dyspnea, unspecified type  R06.00        Discharge Medications:  Discharge Medication List as of 3/24/2022 11:52 PM          Scribe Disclosure:  I, Randi Mara, am serving as a scribe at 8:23 PM on 3/24/2022 to document services personally performed by Larry Bridges MD based on my observations and the provider's statements to me.          Larry Bridges MD  03/25/22 0040

## 2022-03-25 NOTE — DISCHARGE INSTRUCTIONS
Discharge Instructions  Shortness of breath    You have been seen today for shortness of breath.  At this time, your provider has found no signs that your shortness of breath is due to a serious or life-threatening condition, (or you have declined more testing and/or admission to the hospital). However, sometimes there is a serious problem that does not show up right away. Your evaluation today may not be complete and you may need further testing and evaluation.     Generally, every Emergency Department visit should have a follow-up clinic visit with either a primary or a specialty clinic/provider. Please follow-up as instructed by your emergency provider today.  Return to the Emergency Department if:  Your shortness of breath changes, gets worse, starts to happen more often, or comes with less activity.  You are newly chest pain  You get very weak or tired.  You pass out or faint.  You have any new symptoms, like fever, cough, numb legs, or you cough up blood.  You have anything else that worries you.    Until you follow-up with your regular provider, please do the following:  Take one aspirin daily unless you have an allergy or are told not to by your provider.  If a stress test appointment has been made, go to the appointment.  If you have questions, contact your regular provider.  Follow-up with your regular provider/clinic as directed; this is very important.    If you were given a prescription for medicine here today, be sure to read all of the information (including the package insert) that comes with your prescription.  This will include important information about the medicine, its side effects, and any warnings that you need to know about.  The pharmacist who fills the prescription can provide more information and answer questions you may have about the medicine.  If you have questions or concerns that the pharmacist cannot address, please call or return to the Emergency Department.       Remember that  you can always come back to the Emergency Department if you are not able to see your regular provider in the amount of time listed above, if you get any new symptoms, or if there is anything that worries you.

## 2022-03-25 NOTE — ED TRIAGE NOTES
SOB on and off for a few months has been thinking is anxiety so she took her Lorazeam 1mg at about 1400 but still feels SOB. Denies any chest pain or other symptoms.

## 2023-08-04 ENCOUNTER — ANESTHESIA EVENT (OUTPATIENT)
Dept: SURGERY | Facility: CLINIC | Age: 68
End: 2023-08-04

## 2023-08-04 ENCOUNTER — HOSPITAL ENCOUNTER (OUTPATIENT)
Facility: CLINIC | Age: 68
Discharge: HOME | End: 2023-08-04
Attending: PLASTIC SURGERY | Admitting: PLASTIC SURGERY

## 2023-08-04 ENCOUNTER — ANESTHESIA (OUTPATIENT)
Dept: SURGERY | Facility: CLINIC | Age: 68
End: 2023-08-04

## 2023-08-04 VITALS
TEMPERATURE: 96.8 F | HEIGHT: 63 IN | RESPIRATION RATE: 14 BRPM | BODY MASS INDEX: 24.98 KG/M2 | HEART RATE: 108 BPM | DIASTOLIC BLOOD PRESSURE: 83 MMHG | SYSTOLIC BLOOD PRESSURE: 127 MMHG | WEIGHT: 141 LBS | OXYGEN SATURATION: 96 %

## 2023-08-04 DIAGNOSIS — R23.8 FACIAL AGING: Primary | ICD-10-CM

## 2023-08-04 PROCEDURE — 250N000009 HC RX 250: Performed by: PLASTIC SURGERY

## 2023-08-04 PROCEDURE — 250N000013 HC RX MED GY IP 250 OP 250 PS 637: Performed by: ANESTHESIOLOGY

## 2023-08-04 PROCEDURE — 250N000013 HC RX MED GY IP 250 OP 250 PS 637: Performed by: PLASTIC SURGERY

## 2023-08-04 PROCEDURE — 272N000001 HC OR GENERAL SUPPLY STERILE: Performed by: PLASTIC SURGERY

## 2023-08-04 PROCEDURE — 250N000009 HC RX 250: Performed by: NURSE ANESTHETIST, CERTIFIED REGISTERED

## 2023-08-04 PROCEDURE — 370N000017 HC ANESTHESIA TECHNICAL FEE, PER MIN: Performed by: PLASTIC SURGERY

## 2023-08-04 PROCEDURE — 360N000076 HC SURGERY LEVEL 3, PER MIN: Performed by: PLASTIC SURGERY

## 2023-08-04 PROCEDURE — 999N000141 HC STATISTIC PRE-PROCEDURE NURSING ASSESSMENT: Performed by: PLASTIC SURGERY

## 2023-08-04 PROCEDURE — 250N000011 HC RX IP 250 OP 636: Mod: JZ | Performed by: NURSE ANESTHETIST, CERTIFIED REGISTERED

## 2023-08-04 PROCEDURE — 250N000011 HC RX IP 250 OP 636: Performed by: PLASTIC SURGERY

## 2023-08-04 PROCEDURE — 258N000003 HC RX IP 258 OP 636: Performed by: NURSE ANESTHETIST, CERTIFIED REGISTERED

## 2023-08-04 PROCEDURE — 250N000025 HC SEVOFLURANE, PER MIN: Performed by: PLASTIC SURGERY

## 2023-08-04 PROCEDURE — 710N000009 HC RECOVERY PHASE 1, LEVEL 1, PER MIN: Performed by: PLASTIC SURGERY

## 2023-08-04 PROCEDURE — 93005 ELECTROCARDIOGRAM TRACING: CPT

## 2023-08-04 PROCEDURE — 258N000001 HC RX 258: Performed by: PLASTIC SURGERY

## 2023-08-04 PROCEDURE — 710N000012 HC RECOVERY PHASE 2, PER MINUTE: Performed by: PLASTIC SURGERY

## 2023-08-04 PROCEDURE — 250N000011 HC RX IP 250 OP 636: Mod: JZ | Performed by: PLASTIC SURGERY

## 2023-08-04 PROCEDURE — P9045 ALBUMIN (HUMAN), 5%, 250 ML: HCPCS | Mod: JZ | Performed by: NURSE ANESTHETIST, CERTIFIED REGISTERED

## 2023-08-04 RX ORDER — SODIUM CHLORIDE, SODIUM LACTATE, POTASSIUM CHLORIDE, CALCIUM CHLORIDE 600; 310; 30; 20 MG/100ML; MG/100ML; MG/100ML; MG/100ML
INJECTION, SOLUTION INTRAVENOUS CONTINUOUS
Status: DISCONTINUED | OUTPATIENT
Start: 2023-08-04 | End: 2023-08-04 | Stop reason: HOSPADM

## 2023-08-04 RX ORDER — HEPARIN SODIUM 5000 [USP'U]/.5ML
5000 INJECTION, SOLUTION INTRAVENOUS; SUBCUTANEOUS
Status: COMPLETED | OUTPATIENT
Start: 2023-08-04 | End: 2023-08-04

## 2023-08-04 RX ORDER — HYDROMORPHONE HCL IN WATER/PF 6 MG/30 ML
0.2 PATIENT CONTROLLED ANALGESIA SYRINGE INTRAVENOUS EVERY 5 MIN PRN
Status: DISCONTINUED | OUTPATIENT
Start: 2023-08-04 | End: 2023-08-04 | Stop reason: HOSPADM

## 2023-08-04 RX ORDER — ONDANSETRON 2 MG/ML
4 INJECTION INTRAMUSCULAR; INTRAVENOUS EVERY 30 MIN PRN
Status: DISCONTINUED | OUTPATIENT
Start: 2023-08-04 | End: 2023-08-04 | Stop reason: HOSPADM

## 2023-08-04 RX ORDER — LORAZEPAM 1 MG/1
1 TABLET ORAL EVERY 6 HOURS PRN
COMMUNITY

## 2023-08-04 RX ORDER — FENTANYL CITRATE 50 UG/ML
50 INJECTION, SOLUTION INTRAMUSCULAR; INTRAVENOUS EVERY 5 MIN PRN
Status: DISCONTINUED | OUTPATIENT
Start: 2023-08-04 | End: 2023-08-04 | Stop reason: HOSPADM

## 2023-08-04 RX ORDER — OXYCODONE HYDROCHLORIDE 5 MG/1
10 TABLET ORAL
Status: DISCONTINUED | OUTPATIENT
Start: 2023-08-04 | End: 2023-08-04 | Stop reason: HOSPADM

## 2023-08-04 RX ORDER — MEPERIDINE HYDROCHLORIDE 25 MG/ML
12.5 INJECTION INTRAMUSCULAR; INTRAVENOUS; SUBCUTANEOUS EVERY 5 MIN PRN
Status: DISCONTINUED | OUTPATIENT
Start: 2023-08-04 | End: 2023-08-04 | Stop reason: HOSPADM

## 2023-08-04 RX ORDER — FENTANYL CITRATE 50 UG/ML
INJECTION, SOLUTION INTRAMUSCULAR; INTRAVENOUS PRN
Status: DISCONTINUED | OUTPATIENT
Start: 2023-08-04 | End: 2023-08-04

## 2023-08-04 RX ORDER — ONDANSETRON 4 MG/1
4 TABLET, ORALLY DISINTEGRATING ORAL EVERY 30 MIN PRN
Status: DISCONTINUED | OUTPATIENT
Start: 2023-08-04 | End: 2023-08-04 | Stop reason: HOSPADM

## 2023-08-04 RX ORDER — EPHEDRINE SULFATE 50 MG/ML
INJECTION, SOLUTION INTRAMUSCULAR; INTRAVENOUS; SUBCUTANEOUS PRN
Status: DISCONTINUED | OUTPATIENT
Start: 2023-08-04 | End: 2023-08-04

## 2023-08-04 RX ORDER — HYDROCODONE BITARTRATE AND ACETAMINOPHEN 5; 325 MG/1; MG/1
1 TABLET ORAL EVERY 6 HOURS PRN
Qty: 12 TABLET | Refills: 0 | Status: SHIPPED | OUTPATIENT
Start: 2023-08-04 | End: 2023-08-07

## 2023-08-04 RX ORDER — DEXAMETHASONE SODIUM PHOSPHATE 4 MG/ML
INJECTION, SOLUTION INTRA-ARTICULAR; INTRALESIONAL; INTRAMUSCULAR; INTRAVENOUS; SOFT TISSUE PRN
Status: DISCONTINUED | OUTPATIENT
Start: 2023-08-04 | End: 2023-08-04

## 2023-08-04 RX ORDER — LIDOCAINE HYDROCHLORIDE AND EPINEPHRINE 10; 10 MG/ML; UG/ML
INJECTION, SOLUTION INFILTRATION; PERINEURAL PRN
Status: DISCONTINUED | OUTPATIENT
Start: 2023-08-04 | End: 2023-08-04 | Stop reason: HOSPADM

## 2023-08-04 RX ORDER — HYDRALAZINE HYDROCHLORIDE 20 MG/ML
2.5-5 INJECTION INTRAMUSCULAR; INTRAVENOUS
Status: DISCONTINUED | OUTPATIENT
Start: 2023-08-04 | End: 2023-08-04 | Stop reason: HOSPADM

## 2023-08-04 RX ORDER — SODIUM CHLORIDE, SODIUM LACTATE, POTASSIUM CHLORIDE, CALCIUM CHLORIDE 600; 310; 30; 20 MG/100ML; MG/100ML; MG/100ML; MG/100ML
INJECTION, SOLUTION INTRAVENOUS CONTINUOUS PRN
Status: DISCONTINUED | OUTPATIENT
Start: 2023-08-04 | End: 2023-08-04

## 2023-08-04 RX ORDER — PROPOFOL 10 MG/ML
INJECTION, EMULSION INTRAVENOUS CONTINUOUS PRN
Status: DISCONTINUED | OUTPATIENT
Start: 2023-08-04 | End: 2023-08-04

## 2023-08-04 RX ORDER — CEFAZOLIN SODIUM/WATER 2 G/20 ML
2 SYRINGE (ML) INTRAVENOUS SEE ADMIN INSTRUCTIONS
Status: DISCONTINUED | OUTPATIENT
Start: 2023-08-04 | End: 2023-08-04 | Stop reason: HOSPADM

## 2023-08-04 RX ORDER — CEFAZOLIN SODIUM/WATER 2 G/20 ML
2 SYRINGE (ML) INTRAVENOUS
Status: COMPLETED | OUTPATIENT
Start: 2023-08-04 | End: 2023-08-04

## 2023-08-04 RX ORDER — OXYCODONE HYDROCHLORIDE 5 MG/1
5 TABLET ORAL
Status: COMPLETED | OUTPATIENT
Start: 2023-08-04 | End: 2023-08-04

## 2023-08-04 RX ORDER — LABETALOL HYDROCHLORIDE 5 MG/ML
5 INJECTION, SOLUTION INTRAVENOUS
Status: DISCONTINUED | OUTPATIENT
Start: 2023-08-04 | End: 2023-08-04 | Stop reason: HOSPADM

## 2023-08-04 RX ORDER — ONDANSETRON 2 MG/ML
INJECTION INTRAMUSCULAR; INTRAVENOUS PRN
Status: DISCONTINUED | OUTPATIENT
Start: 2023-08-04 | End: 2023-08-04

## 2023-08-04 RX ORDER — PROPOFOL 10 MG/ML
INJECTION, EMULSION INTRAVENOUS PRN
Status: DISCONTINUED | OUTPATIENT
Start: 2023-08-04 | End: 2023-08-04

## 2023-08-04 RX ORDER — MAGNESIUM HYDROXIDE 1200 MG/15ML
LIQUID ORAL PRN
Status: DISCONTINUED | OUTPATIENT
Start: 2023-08-04 | End: 2023-08-04 | Stop reason: HOSPADM

## 2023-08-04 RX ORDER — ACETAMINOPHEN 500 MG
1000 TABLET ORAL ONCE
Status: COMPLETED | OUTPATIENT
Start: 2023-08-04 | End: 2023-08-04

## 2023-08-04 RX ORDER — FENTANYL CITRATE 50 UG/ML
25 INJECTION, SOLUTION INTRAMUSCULAR; INTRAVENOUS EVERY 5 MIN PRN
Status: DISCONTINUED | OUTPATIENT
Start: 2023-08-04 | End: 2023-08-04 | Stop reason: HOSPADM

## 2023-08-04 RX ORDER — LIDOCAINE HYDROCHLORIDE 20 MG/ML
INJECTION, SOLUTION INFILTRATION; PERINEURAL PRN
Status: DISCONTINUED | OUTPATIENT
Start: 2023-08-04 | End: 2023-08-04

## 2023-08-04 RX ADMIN — DEXAMETHASONE SODIUM PHOSPHATE 4 MG: 4 INJECTION, SOLUTION INTRA-ARTICULAR; INTRALESIONAL; INTRAMUSCULAR; INTRAVENOUS; SOFT TISSUE at 08:00

## 2023-08-04 RX ADMIN — LIDOCAINE HYDROCHLORIDE 60 MG: 20 INJECTION, SOLUTION INFILTRATION; PERINEURAL at 07:42

## 2023-08-04 RX ADMIN — PHENYLEPHRINE HYDROCHLORIDE 100 MCG: 10 INJECTION INTRAVENOUS at 10:30

## 2023-08-04 RX ADMIN — PROPOFOL 180 MG: 10 INJECTION, EMULSION INTRAVENOUS at 07:42

## 2023-08-04 RX ADMIN — PHENYLEPHRINE HYDROCHLORIDE 150 MCG: 10 INJECTION INTRAVENOUS at 08:12

## 2023-08-04 RX ADMIN — PHENYLEPHRINE HYDROCHLORIDE 100 MCG: 10 INJECTION INTRAVENOUS at 07:56

## 2023-08-04 RX ADMIN — HYDROMORPHONE HYDROCHLORIDE 0.3 MG: 1 INJECTION, SOLUTION INTRAMUSCULAR; INTRAVENOUS; SUBCUTANEOUS at 09:51

## 2023-08-04 RX ADMIN — FENTANYL CITRATE 50 MCG: 50 INJECTION, SOLUTION INTRAMUSCULAR; INTRAVENOUS at 07:42

## 2023-08-04 RX ADMIN — OXYCODONE HYDROCHLORIDE 5 MG: 5 TABLET ORAL at 12:52

## 2023-08-04 RX ADMIN — PHENYLEPHRINE HYDROCHLORIDE 100 MCG: 10 INJECTION INTRAVENOUS at 08:04

## 2023-08-04 RX ADMIN — MIDAZOLAM 2 MG: 1 INJECTION INTRAMUSCULAR; INTRAVENOUS at 07:36

## 2023-08-04 RX ADMIN — ROCURONIUM BROMIDE 20 MG: 50 INJECTION, SOLUTION INTRAVENOUS at 08:50

## 2023-08-04 RX ADMIN — SUGAMMADEX 200 MG: 100 INJECTION, SOLUTION INTRAVENOUS at 11:44

## 2023-08-04 RX ADMIN — SODIUM CHLORIDE, POTASSIUM CHLORIDE, SODIUM LACTATE AND CALCIUM CHLORIDE: 600; 310; 30; 20 INJECTION, SOLUTION INTRAVENOUS at 07:36

## 2023-08-04 RX ADMIN — HEPARIN SODIUM 5000 UNITS: 5000 INJECTION, SOLUTION INTRAVENOUS; SUBCUTANEOUS at 07:19

## 2023-08-04 RX ADMIN — FENTANYL CITRATE 50 MCG: 50 INJECTION, SOLUTION INTRAMUSCULAR; INTRAVENOUS at 08:50

## 2023-08-04 RX ADMIN — PHENYLEPHRINE HYDROCHLORIDE 150 MCG: 10 INJECTION INTRAVENOUS at 08:29

## 2023-08-04 RX ADMIN — HYDROMORPHONE HYDROCHLORIDE 0.2 MG: 1 INJECTION, SOLUTION INTRAMUSCULAR; INTRAVENOUS; SUBCUTANEOUS at 11:01

## 2023-08-04 RX ADMIN — ALBUMIN HUMAN: 0.05 INJECTION, SOLUTION INTRAVENOUS at 10:30

## 2023-08-04 RX ADMIN — ROCURONIUM BROMIDE 10 MG: 50 INJECTION, SOLUTION INTRAVENOUS at 10:16

## 2023-08-04 RX ADMIN — ROCURONIUM BROMIDE 10 MG: 50 INJECTION, SOLUTION INTRAVENOUS at 09:39

## 2023-08-04 RX ADMIN — PHENYLEPHRINE HYDROCHLORIDE 150 MCG: 10 INJECTION INTRAVENOUS at 08:22

## 2023-08-04 RX ADMIN — Medication 2 G: at 11:36

## 2023-08-04 RX ADMIN — PHENYLEPHRINE HYDROCHLORIDE 0.3 MCG/KG/MIN: 10 INJECTION INTRAVENOUS at 08:21

## 2023-08-04 RX ADMIN — ONDANSETRON 4 MG: 2 INJECTION INTRAMUSCULAR; INTRAVENOUS at 10:54

## 2023-08-04 RX ADMIN — ROCURONIUM BROMIDE 50 MG: 50 INJECTION, SOLUTION INTRAVENOUS at 07:43

## 2023-08-04 RX ADMIN — Medication 2 G: at 07:36

## 2023-08-04 RX ADMIN — PHENYLEPHRINE HYDROCHLORIDE 100 MCG: 10 INJECTION INTRAVENOUS at 11:14

## 2023-08-04 RX ADMIN — SODIUM CHLORIDE, POTASSIUM CHLORIDE, SODIUM LACTATE AND CALCIUM CHLORIDE: 600; 310; 30; 20 INJECTION, SOLUTION INTRAVENOUS at 08:37

## 2023-08-04 RX ADMIN — PROPOFOL 30 MCG/KG/MIN: 10 INJECTION, EMULSION INTRAVENOUS at 07:47

## 2023-08-04 RX ADMIN — ACETAMINOPHEN 1000 MG: 500 TABLET ORAL at 06:28

## 2023-08-04 RX ADMIN — PHENYLEPHRINE HYDROCHLORIDE 150 MCG: 10 INJECTION INTRAVENOUS at 09:05

## 2023-08-04 RX ADMIN — Medication 5 MG: at 08:41

## 2023-08-04 ASSESSMENT — ENCOUNTER SYMPTOMS
SEIZURES: 0
DYSRHYTHMIAS: 0

## 2023-08-04 ASSESSMENT — ACTIVITIES OF DAILY LIVING (ADL)
ADLS_ACUITY_SCORE: 35

## 2023-08-04 ASSESSMENT — COPD QUESTIONNAIRES: COPD: 0

## 2023-08-04 ASSESSMENT — LIFESTYLE VARIABLES: TOBACCO_USE: 0

## 2023-08-04 NOTE — ANESTHESIA PREPROCEDURE EVALUATION
Anesthesia Pre-Procedure Evaluation    Patient: Mag Khanna   MRN: [id number] : 1955        Procedure : Procedure(s):  Cosmetic facelift  Cosmetic bilateral upper blepharoplasty          Past Medical History:   Diagnosis Date    Anxiety       Past Surgical History:   Procedure Laterality Date    CATARACT EXTRACTION      HYSTERECTOMY      MS BREAST AUGMENTATION        No Known Allergies   Social History     Tobacco Use    Smoking status: Former    Smokeless tobacco: Never   Substance Use Topics    Alcohol use: Not on file     Comment: 1 week      Wt Readings from Last 1 Encounters:   23 64 kg (141 lb)        Anesthesia Evaluation            ROS/MED HX  ENT/Pulmonary:    (-) tobacco use, asthma, COPD and sleep apnea   Neurologic:    (-) no seizures and no CVA   Cardiovascular:    (-) hypertension, CAD, CHF and arrhythmias   METS/Exercise Tolerance:     Hematologic:       Musculoskeletal:       GI/Hepatic:    (-) GERD and liver disease   Renal/Genitourinary:    (-) renal disease   Endo:    (-) Type I DM and Type II DM   Psychiatric/Substance Use:     (+) psychiatric history anxiety       Infectious Disease:       Malignancy:       Other:            Physical Exam    Airway        Mallampati: II   TM distance: > 3 FB   Neck ROM: full   Mouth opening: > 3 cm    Respiratory Devices and Support         Dental  no notable dental history     (+) Minor Abnormalities - some fillings, tiny chips      Cardiovascular          Rhythm and rate: regular     Pulmonary           breath sounds clear to auscultation           OUTSIDE LABS:  CBC:   Lab Results   Component Value Date    WBC 8.5 2022    WBC 9.2 2021    HGB 14.2 2022    HGB 14.0 2021    HCT 43.5 2022    HCT 41.6 2021     2022     2021     BMP:   Lab Results   Component Value Date     2022     2021    POTASSIUM 3.9 2022    POTASSIUM 3.0 (L) 2021    CHLORIDE 113 (H)  03/24/2022    CHLORIDE 112 (H) 04/16/2021    CO2 21 03/24/2022    CO2 23 04/16/2021    BUN 12 03/24/2022    BUN 12 04/16/2021    CR 0.66 03/24/2022    CR 0.72 04/16/2021     (H) 03/24/2022    GLC 92 04/16/2021     COAGS: No results found for: PTT, INR, FIBR  POC:   Lab Results   Component Value Date    HCG Negative 12/15/2006     HEPATIC:   Lab Results   Component Value Date    ALBUMIN 4.1 12/15/2006    PROTTOTAL 7.3 12/15/2006    ALT 39 12/15/2006    AST 36 12/15/2006    ALKPHOS 127 12/15/2006    BILITOTAL <0.1 (L) 12/15/2006     OTHER:   Lab Results   Component Value Date    KATHI 8.6 03/24/2022    LIPASE 155 02/27/2006       Anesthesia Plan    ASA Status:  1       Anesthesia Type: General.     - Airway: ETT   Induction: Intravenous, Propofol.   Maintenance: Balanced.        Consents    Anesthesia Plan(s) and associated risks, benefits, and realistic alternatives discussed. Questions answered and patient/representative(s) expressed understanding.     - Discussed:     - Discussed with:  Patient            Postoperative Care    Pain management: Multi-modal analgesia.   PONV prophylaxis: Ondansetron (or other 5HT-3), Dexamethasone or Solumedrol, Background Propofol Infusion     Comments:                Lamont Lorenz MD

## 2023-08-04 NOTE — BRIEF OP NOTE
[facility]    Brief Operative Note    Pre-operative diagnosis: Encounter for cosmetic procedure [Z41.1]  Post-operative diagnosis facial aging    Procedure: Procedure(s):  Cosmetic facelift  Cosmetic bilateral upper blepharoplasty  Surgeon: Surgeon(s) and Role:     * Susannah Cm MD - Primary  Anesthesia: General   Estimated Blood Loss: 15 mL from 8/4/2023  7:36 AM to 8/4/2023 11:53 AM      Drains: Santos-Castellano  Specimens: * No specimens in log *  Findings:   None.  Complications: None.  Implants: * No implants in log *

## 2023-08-04 NOTE — ANESTHESIA PROCEDURE NOTES
Airway       Patient location during procedure: OR       Procedure Start/Stop Times: 8/4/2023 7:45 AM  Staff -        Anesthesiologist:  Lamont Lorenz MD       CRNA: Ariana Nickerson APRN CRNA       Performed By: CRNA  Consent for Airway        Urgency: elective  Indications and Patient Condition       Indications for airway management: erika-procedural       Induction type:intravenous       Mask difficulty assessment: 1 - vent by mask    Final Airway Details       Final airway type: endotracheal airway       Successful airway: ETT - single  Endotracheal Airway Details        ETT size (mm): 7.0       Cuffed: yes       Successful intubation technique: direct laryngoscopy       DL Blade Type: Salcedo 2       Grade View of Cords: 1       Adjucts: stylet       Position: Center       Measured from: gums/teeth       Secured at (cm): 22       Bite block used: None    Post intubation assessment        Placement verified by: capnometry, equal breath sounds and chest rise        Number of attempts at approach: 1       Number of other approaches attempted: 0       Secured with: silk tape       Ease of procedure: easy       Dentition: Unchanged    Medication(s) Administered   Medication Administration Time: 8/4/2023 7:45 AM

## 2023-08-04 NOTE — ANESTHESIA CARE TRANSFER NOTE
Patient: Mag Khanna    Procedure: Procedure(s):  Cosmetic facelift  Cosmetic bilateral upper blepharoplasty       Diagnosis: Encounter for cosmetic procedure [Z41.1]  Diagnosis Additional Information: No value filed.    Anesthesia Type:   General     Note:    Oropharynx: oropharynx clear of all foreign objects  Level of Consciousness: awake  Oxygen Supplementation: face mask  Level of Supplemental Oxygen (L/min / FiO2): 6  Independent Airway: airway patency satisfactory and stable  Dentition: dentition unchanged  Vital Signs Stable: post-procedure vital signs reviewed and stable  Report to RN Given: handoff report given  Patient transferred to: PACU    Handoff Report: Identifed the Patient, Identified the Reponsible Provider, Reviewed the pertinent medical history, Discussed the surgical course, Reviewed Intra-OP anesthesia mangement and issues during anesthesia, Set expectations for post-procedure period and Allowed opportunity for questions and acknowledgement of understanding      Vitals:  Vitals Value Taken Time   /78 08/04/23 1156   Temp     Pulse 123 08/04/23 1158   Resp 17 08/04/23 1158   SpO2 100 % 08/04/23 1158   Vitals shown include unvalidated device data.    Electronically Signed By: CHRISTIE Fox CRNA  August 4, 2023  12:00 PM

## 2023-08-04 NOTE — ANESTHESIA POSTPROCEDURE EVALUATION
Patient: Mag Khanna    Procedure: Procedure(s):  Cosmetic facelift  Cosmetic bilateral upper blepharoplasty       Anesthesia Type:  General    Note:     Postop Pain Control: Uneventful            Sign Out: Well controlled pain   PONV:    Neuro/Psych: Uneventful            Sign Out: Acceptable/Baseline neuro status   Airway/Respiratory: Uneventful            Sign Out: Acceptable/Baseline resp. status   CV/Hemodynamics: Uneventful            Sign Out: Acceptable CV status; No obvious hypovolemia; No obvious fluid overload   Other NRE:    DID A NON-ROUTINE EVENT OCCUR?            Last vitals:  Vitals Value Taken Time   /78 08/04/23 1300   Temp 36.4  C (97.5  F) 08/04/23 1156   Pulse 108 08/04/23 1305   Resp 0 08/04/23 1305   SpO2 93 % 08/04/23 1305   Vitals shown include unvalidated device data.    Electronically Signed By: Lamont Lorenz MD  August 4, 2023  4:57 PM

## 2023-08-04 NOTE — DISCHARGE INSTRUCTIONS
Today you were given 1000 mg of Tylenol at 6;30AM. The recommended daily maximum dose is 4000 mg.   You were also given 5 mg of Oxycodone (a narcotic pain medication at 12:55 p.m.    Santos Castellano Drain  Home Care Instructions    What is a Santos Castellano (CHANDU) Drain?  This is a small tube that connects to a bulb.  Its gentle suction removes extra fluid from a surgical wound.  Your doctor will remove the tube when the amount of fluid decreases.  The color and amount of fluid varies.  Right after surgery the fluid is bright red.  Over time, it changes to light pink and may become clear or the color of straw.    How should I care for my tube site?  Keep the skin around the tube dry.  Check with your doctor about how to shower.  You may need to cover the site with plastic when you shower.  Or, it may be okay to let the site get wet and put on a clean bandage after you shower.    If the bandage gets wet, you will need to change it.  How should I care for the bulb?  Keep the bulb compressed at all times except while you empty it.   Attach the bulb to your clothing with tape and a safety pin.  Try to empty the bulb at the same time every day.  Empty the bulb at least once a day, or when the bulb becomes half full.  If it becomes too full, there will not be enough suction.    To empty the bulb:  Wash your hands.  Open the bulb cap.  Drain the fluid from the bulb into the measuring cup.  If you have two drains, use two cups.    Clean the mouth of the bulb with an alcohol wipe if your nurse told you to.  Squeeze the bulb (fold it in half before you close the bulb cap) If it does not stay compressed, call your nurse or clinic.  Write the amount of drainage on the drainage record (see back page).  If you have two drains, write the amount for each bulb.  Flush the drainage down the toilet.  Rinse the measuring cup.  Wash your hands.    When should I call my doctor?   Call your doctor if:  You have a fever over 101 F (38.3 C),  taken under the tongue.   The drainage increases or smells bad.  The skin around your tube has increased redness, swelling, warmth or pain.  You have pus or fluid leaking at the tube site.  Your stitches break.  You think the tube is not draining.  The tube falls out.  You have any problems or concerns.    Your drainage record:    Empty your bulb at least once per day or when 1/2 to 1/3 full.  Write down the date, time and amount of drainage in each bulb.   Bring this record to each clinic visit.    Date Time Bulb 1: amount of  Drainage in (ml or cc) Bulb 2: amount of drainage (in ml or cc) Notes                                                        Same Day Surgery Discharge Instructions for  Sedation and General Anesthesia     It's not unusual to feel dizzy, light-headed or faint for up to 24 hours after surgery or while taking pain medication.  If you have these symptoms: sit for a few minutes before standing and have someone assist you when you get up to walk or use the bathroom.    You should rest and relax for the next 24 hours. We recommend you make arrangements to have an adult stay with you for at least 24 hours after your discharge.  Avoid hazardous and strenuous activity.    DO NOT DRIVE any vehicle or operate mechanical equipment for 24 hours following the end of your surgery.  Even though you may feel normal, your reactions may be affected by the medication you have received.    Do not drink alcoholic beverages for 24 hours following surgery.     Slowly progress to your regular diet as you feel able. It's not unusual to feel nauseated and/or vomit after receiving anesthesia.  If you develop these symptoms, drink clear liquids (apple juice, ginger ale, broth, 7-up, etc. ) until you feel better.  If your nausea and vomiting persists for 24 hours, please notify your surgeon.      All narcotic pain medications, along with inactivity and anesthesia, can cause constipation. Drinking plenty of liquids and  increasing fiber intake will help.    For any questions of a medical nature, call your surgeon.    Do not make important decisions for 24 hours.    If you had general anesthesia, you may have a sore throat for a couple of days related to the breathing tube used during surgery.  You may use Cepacol lozenges to help with this discomfort.  If it worsens or if you develop a fever, contact your surgeon.     If you feel your pain is not well managed with the pain medications prescribed by your surgeon, please contact your surgeon's office to let them know so they can address your concerns.     **If you have questions or concerns about your procedure,  call Dr. Cm at [phone]**

## 2023-08-06 NOTE — OP NOTE
PLASTIC SURGERY OPERATIVE NOTE  Patient Name:  Mag Khanna     Date of Service:  8/4/2023     PREOPERATIVE DIAGNOSES:   1.  Encounter for cosmetic procedure [Z41.1]   2.  Bilateral dermatochalasis  3.  Facial aging    POSTOPERATIVE DIAGNOSES:   1.  Encounter for cosmetic procedure [Z41.1]    2.  Bilateral dermatochalasis  3.  Facial aging      SURGEON:  Susannah Cm MD   OR STAFF:  Circulator: Syeda Reis RN  Relief Circulator: Mikki Bradshaw RN  Relief Scrub: Belkys Escobar  Scrub Person: Dax Felix     ANESTHESIA:  General     ESTIMATED BLOOD LOSS:  15 mL    SPECIMEN(S):  * No specimens in log *     PROCEDURES:   1.  Bilateral upper lid blepharoplasty  2.  Facelift      DESCRIPTION OF PROCEDURE:  Patient was marked for incisions and taken to the operating room.  General anesthesia was administered.  After securing the endotracheal tube, the face was prepped and draped in a sterile manner.    After confirming upper eyelid incisions, 1% lidocaine with epinephrine was infiltrated along the upper eyelids.  On the right side, excess skin was sharply excised.  Hemostasis was achieved.  The incision was reapproximated interrrupted 5-0 Monocryl laterally in the subcutaneous tissue followed by interrupted 6-0 nylon suture.  Over the globe, the incision was reapproximated with a running subcuticular 5-0 nylon suture.  Suture ends were secured with Steri-Strips, Lacri-Lube was placed in the eyes and the eyes were closed with a Tegaderm dressing.    A similar procedure was completed on the other side.  The excess skin was sharply excised and the incision was reapproximated with interrupted 5-0 Monocryl suture in the subcutaneous tissue laterally followed by interrupted 6-0 nylon suture.  Over the globe, the incision was reapproximated with a running subcuticular 5-0 nylon suture. Suture ends were secured with Steri-Strips, Lacri-Lube was placed in the eyes and the eyes were closed with a Tegaderm  dressing.    The face was then addressed, a postauricular incision was made on the right side following the preoperative markings and the skin/subcutaneous flap was elevated.  Dissection was carried out anteriorly to the midline.  An incision was then made anterior to the ear around the tragus and the skin/subcutaneous flap was elevated at the level of this mass.  This dissection was carried out toward the neck dissection.  Hemostasis was achieved.      A similar incision/dissection was completed on the other side. A postauricular incision was made on the right side following the preoperative markings and the skin/subcutaneous flap was elevated.  Dissection was carried out anteriorly to the midline.  An incision was then made anterior to the ear around the tragus and the skin/subcutaneous flap was elevated at the level of this mass.  This dissection was carried out toward the neck dissection.  Hemostasis was achieved.      Patient was then placed in the Trendelenburg position and a submental incision was made.  Again a skin/subcutaneous flap was elevated and this dissection connected to previous areas of dissection.  Hemostasis was achieved.  The patient did have laxity of the platysma muscle and overlying adiposity.  The adipose tissue was removed.  The platysma was then plicated along the midline using interrupted 3-0 PDS suture.    On the left side, the SMAS laxity was then elevated and plicated along the malar ridge.  This was done with interrupted 3-0 PDS suture.  A platysmal window was also performed and this mass was secured to the posterior aspect of the sternocleidomastoid also using 3-0 PDS suture.    The skin was then redraped and excess skin was removed from the mastoid area.  The incision was reapproximated with interrupted 3-0 Monocryl in the subcutaneous tissue followed by running 4-0 nylon suture.  Anterior to the ear, excess skin was marked above the tragus and this incision was closed with a  running 5-0 nylon suture.  The inferior to the tragus excess skin was removed and this incision was reapproximated with interrupted 4-0 Monocryl in the postauricular area followed by interrupted and running 5-0 nylon suture.    Excess skin over the tragus was excised and then the remaining skin was debulked of the subcutaneous tissue.  The incision was reapproximated interrupted 6-0 nylon suture.    The same plication/closure was done on the right side. On the left side, the SMAS laxity was then elevated and plicated along the malar ridge.  This was done with interrupted 3-0 PDS suture.  A platysmal window was also performed and this mass was secured to the posterior aspect of the sternocleidomastoid also using 3-0 PDS suture.     The skin was then redraped and excess skin was removed from the mastoid area.  The incision was reapproximated with interrupted 3-0 Monocryl in the subcutaneous tissue followed by running 4-0 nylon suture.  Anterior to the ear, excess skin was marked above the tragus and this incision was closed with a running 5-0 nylon suture.  The inferior to the tragus excess skin was removed and this incision was reapproximated with interrupted 4-0 Monocryl in the postauricular area followed by interrupted and running 5-0 nylon suture.     Excess skin over the tragus was excised and then the remaining skin was debulked of the subcutaneous tissue.  The incision was reapproximated interrupted 6-0 nylon suture.   Prior to closure, 10 [place] CHANDU drain was placed.    The neck was then reinspected and hemostasis was confirmed.  Additional adipose tissue was removed from the jowl area.  The platysma was transected its entire length.  Hemostasis was achieved.  The incision was reapproximated with interrupted 5-0 Monocryl suture followed by interrupted 5-0 nylon in the skin.  Benzoin and Steri-Strips were applied.    The face/hair was then cleansed and the suture ends of the eye incisions were secured with  Steri-Strips.  TopiFoam and an ABD wound was then secured using spandex.  Light dressings were placed around the ears.    IMPLANTS:  * No implants in log *    FINDINGS:  None    MD To[facility]   [phone]

## 2023-08-08 LAB
ATRIAL RATE - MUSE: 112 BPM
DIASTOLIC BLOOD PRESSURE - MUSE: NORMAL MMHG
INTERPRETATION ECG - MUSE: NORMAL
P AXIS - MUSE: 55 DEGREES
PR INTERVAL - MUSE: 150 MS
QRS DURATION - MUSE: 68 MS
QT - MUSE: 342 MS
QTC - MUSE: 466 MS
R AXIS - MUSE: 66 DEGREES
SYSTOLIC BLOOD PRESSURE - MUSE: NORMAL MMHG
T AXIS - MUSE: 48 DEGREES
VENTRICULAR RATE- MUSE: 112 BPM

## (undated) DEVICE — DECANTER VIAL 2006S

## (undated) DEVICE — LINEN TOWEL PACK X5 5464

## (undated) DEVICE — APPLICATOR COTTON TIP 3" PKG OF 10 34831010

## (undated) DEVICE — GLOVE BIOGEL PI MICRO SZ 6.5 48565

## (undated) DEVICE — SOL WATER IRRIG 1000ML BOTTLE 2F7114

## (undated) DEVICE — SU MONOCRYL 5-0 P-3 18" UND Y493G

## (undated) DEVICE — SU MONOCRYL 4-0 PS-2 18" UND Y496G

## (undated) DEVICE — EYE PREP BETADINE 5% SOLUTION 30ML 0065-0411-30

## (undated) DEVICE — SU PDS II 5-0 P-3 18" Z493G

## (undated) DEVICE — SU ETHILON 4-0 PS-2 18" 1667G

## (undated) DEVICE — DECANTER BAG 2002S

## (undated) DEVICE — STPL SKIN 35W 6.9MM  PXW35

## (undated) DEVICE — SU ETHILON 5-0 P-3 18" BLACK 698G

## (undated) DEVICE — COVER CAMERA ENDOVIDEO

## (undated) DEVICE — SU MONOCRYL 3-0 PS-2 27" Y427H

## (undated) DEVICE — ESU ELEC NDL 1" E1552

## (undated) DEVICE — PEN MARKING SKIN FINE 31145942

## (undated) DEVICE — DRSG TEGADERM 2 1/2X 2 3/4"

## (undated) DEVICE — SPONGE RAY-TEC 4X8" 7318

## (undated) DEVICE — PACK HEAD NECK SEN15HNFSF

## (undated) DEVICE — BLADE KNIFE BEAVER MINI BEAVER6700

## (undated) DEVICE — APPLICATOR COTTON TIP 3" X50

## (undated) DEVICE — SOL NACL 0.9% IRRIG 1000ML BOTTLE 2F7124

## (undated) DEVICE — DRSG KERLIX 4 1/2"X4YDS ROLL 6715

## (undated) DEVICE — DRSG STERI STRIP 1/2X4" R1547

## (undated) DEVICE — SPONGE LAP 18X18" X8435

## (undated) DEVICE — MANIFOLD NEPTUNE 4 PORT 700-20

## (undated) DEVICE — PEN MARKING SKIN

## (undated) DEVICE — ESU PENCIL W/HOLSTER E2350H

## (undated) DEVICE — SU PDS II 3-0 SH 27" Z316H

## (undated) DEVICE — CB INFILTRATION TUBING

## (undated) DEVICE — SU MONOCRYL 3-0 SH 27" UND Y416H

## (undated) DEVICE — DRSG GAUZE 2X2" 8042

## (undated) DEVICE — DRAIN JACKSON PRATT RESERVOIR 100ML SU130-1305

## (undated) DEVICE — BLADE KNIFE SURG 15 371115

## (undated) DEVICE — DRAIN JACKSON PRATT 10FR ROUND SU130-1321

## (undated) DEVICE — RETRACTOR RADIALUX 4 INTCHNG BLADE CRDLS 50-101-1

## (undated) DEVICE — SU ETHILON 6-0 P-3 18" BLACK 1698G

## (undated) RX ORDER — HYDROMORPHONE HYDROCHLORIDE 1 MG/ML
INJECTION, SOLUTION INTRAMUSCULAR; INTRAVENOUS; SUBCUTANEOUS
Status: DISPENSED
Start: 2023-08-04

## (undated) RX ORDER — CEFAZOLIN SODIUM/WATER 2 G/20 ML
SYRINGE (ML) INTRAVENOUS
Status: DISPENSED
Start: 2023-08-04

## (undated) RX ORDER — EPINEPHRINE 1 MG/ML
INJECTION, SOLUTION INTRAMUSCULAR; SUBCUTANEOUS
Status: DISPENSED
Start: 2023-08-04

## (undated) RX ORDER — EPHEDRINE SULFATE 50 MG/ML
INJECTION, SOLUTION INTRAMUSCULAR; INTRAVENOUS; SUBCUTANEOUS
Status: DISPENSED
Start: 2023-08-04

## (undated) RX ORDER — GLYCOPYRROLATE 0.2 MG/ML
INJECTION, SOLUTION INTRAMUSCULAR; INTRAVENOUS
Status: DISPENSED
Start: 2023-08-04

## (undated) RX ORDER — HEPARIN SODIUM 5000 [USP'U]/.5ML
INJECTION, SOLUTION INTRAVENOUS; SUBCUTANEOUS
Status: DISPENSED
Start: 2023-08-04

## (undated) RX ORDER — ONDANSETRON 2 MG/ML
INJECTION INTRAMUSCULAR; INTRAVENOUS
Status: DISPENSED
Start: 2023-08-04

## (undated) RX ORDER — DEXAMETHASONE SODIUM PHOSPHATE 4 MG/ML
INJECTION, SOLUTION INTRA-ARTICULAR; INTRALESIONAL; INTRAMUSCULAR; INTRAVENOUS; SOFT TISSUE
Status: DISPENSED
Start: 2023-08-04

## (undated) RX ORDER — FENTANYL CITRATE 50 UG/ML
INJECTION, SOLUTION INTRAMUSCULAR; INTRAVENOUS
Status: DISPENSED
Start: 2023-08-04

## (undated) RX ORDER — LIDOCAINE HYDROCHLORIDE AND EPINEPHRINE 10; 10 MG/ML; UG/ML
INJECTION, SOLUTION INFILTRATION; PERINEURAL
Status: DISPENSED
Start: 2023-08-04

## (undated) RX ORDER — OXYCODONE HYDROCHLORIDE 5 MG/1
TABLET ORAL
Status: DISPENSED
Start: 2023-08-04

## (undated) RX ORDER — CEFAZOLIN SODIUM 1 G/3ML
INJECTION, POWDER, FOR SOLUTION INTRAMUSCULAR; INTRAVENOUS
Status: DISPENSED
Start: 2023-08-04

## (undated) RX ORDER — HYDROCODONE BITARTRATE AND ACETAMINOPHEN 5; 325 MG/1; MG/1
TABLET ORAL
Status: DISPENSED
Start: 2023-08-04

## (undated) RX ORDER — ACETAMINOPHEN 500 MG
TABLET ORAL
Status: DISPENSED
Start: 2023-08-04

## (undated) RX ORDER — PROPOFOL 10 MG/ML
INJECTION, EMULSION INTRAVENOUS
Status: DISPENSED
Start: 2023-08-04